# Patient Record
Sex: MALE | Race: WHITE | URBAN - METROPOLITAN AREA
[De-identification: names, ages, dates, MRNs, and addresses within clinical notes are randomized per-mention and may not be internally consistent; named-entity substitution may affect disease eponyms.]

---

## 2022-04-01 ENCOUNTER — OFFICE VISIT (OUTPATIENT)
Dept: URBAN - METROPOLITAN AREA CLINIC 88 | Facility: CLINIC | Age: 57
End: 2022-04-01
Payer: COMMERCIAL

## 2022-04-01 DIAGNOSIS — H52.4 PRESBYOPIA: Primary | ICD-10-CM

## 2022-04-01 PROCEDURE — 99203 OFFICE O/P NEW LOW 30 MIN: CPT | Performed by: OPHTHALMOLOGY

## 2022-04-01 ASSESSMENT — KERATOMETRY
OS: 43.25
OD: 43.38

## 2022-04-01 ASSESSMENT — INTRAOCULAR PRESSURE
OD: 19
OS: 17

## 2022-04-01 NOTE — IMPRESSION/PLAN
Impression: Presbyopia: H52.4. Plan: Discussed eye findings with patient. VA is stable. I do recommend patient have dilated exams every 2 years.

## 2023-02-01 ENCOUNTER — OFFICE VISIT (OUTPATIENT)
Dept: URBAN - METROPOLITAN AREA CLINIC 88 | Facility: CLINIC | Age: 58
End: 2023-02-01
Payer: COMMERCIAL

## 2023-02-01 DIAGNOSIS — H52.4 PRESBYOPIA: ICD-10-CM

## 2023-02-01 DIAGNOSIS — H40.013 OPEN ANGLE WITH BORDERLINE FINDINGS, LOW RISK, BILATERAL: Primary | ICD-10-CM

## 2023-02-01 PROCEDURE — 99214 OFFICE O/P EST MOD 30 MIN: CPT | Performed by: OPHTHALMOLOGY

## 2023-02-01 ASSESSMENT — KERATOMETRY
OD: 43.13
OS: 43.13

## 2023-02-01 ASSESSMENT — INTRAOCULAR PRESSURE
OS: 19
OD: 18

## 2023-02-01 NOTE — IMPRESSION/PLAN
Impression: Presbyopia: H52.4. Plan: Discussed eye findings with patient. VA is stable. I do recommend patient have dilated exams every 2 years.  Recommend OTC readers +1.75

## 2024-01-16 ENCOUNTER — OFFICE VISIT (OUTPATIENT)
Dept: URBAN - METROPOLITAN AREA CLINIC 88 | Facility: CLINIC | Age: 59
End: 2024-01-16
Payer: COMMERCIAL

## 2024-01-16 DIAGNOSIS — H52.4 PRESBYOPIA: ICD-10-CM

## 2024-01-16 DIAGNOSIS — H04.123 DRY EYE SYNDROME OF BILATERAL LACRIMAL GLANDS: Primary | ICD-10-CM

## 2024-01-16 PROCEDURE — 99214 OFFICE O/P EST MOD 30 MIN: CPT | Performed by: OPHTHALMOLOGY

## 2024-01-16 ASSESSMENT — INTRAOCULAR PRESSURE
OS: 18
OD: 18